# Patient Record
Sex: MALE | Race: WHITE | NOT HISPANIC OR LATINO | Employment: FULL TIME | URBAN - METROPOLITAN AREA
[De-identification: names, ages, dates, MRNs, and addresses within clinical notes are randomized per-mention and may not be internally consistent; named-entity substitution may affect disease eponyms.]

---

## 2017-04-10 ENCOUNTER — OFFICE VISIT (OUTPATIENT)
Dept: MEDICAL GROUP | Facility: MEDICAL CENTER | Age: 52
End: 2017-04-10
Payer: COMMERCIAL

## 2017-04-10 VITALS
OXYGEN SATURATION: 97 % | HEART RATE: 80 BPM | HEIGHT: 73 IN | DIASTOLIC BLOOD PRESSURE: 62 MMHG | RESPIRATION RATE: 18 BRPM | BODY MASS INDEX: 23.1 KG/M2 | SYSTOLIC BLOOD PRESSURE: 124 MMHG | WEIGHT: 174.3 LBS | TEMPERATURE: 98.3 F

## 2017-04-10 DIAGNOSIS — Z76.89 ENCOUNTER TO ESTABLISH CARE: ICD-10-CM
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
## 2017-04-10 DIAGNOSIS — R56.9 SEIZURE (HCC): ICD-10-CM

## 2017-04-10 DIAGNOSIS — Z00.00 PREVENTATIVE HEALTH CARE: ICD-10-CM

## 2017-04-10 DIAGNOSIS — F43.9 STRESS: ICD-10-CM

## 2017-04-10 PROCEDURE — 99204 OFFICE O/P NEW MOD 45 MIN: CPT | Performed by: PHYSICIAN ASSISTANT

## 2017-04-10 RX ORDER — PHENYTOIN SODIUM 300 MG/1
CAPSULE, EXTENDED RELEASE ORAL
COMMUNITY
End: 2017-04-10 | Stop reason: SDUPTHER

## 2017-04-10 RX ORDER — PHENYTOIN SODIUM 300 MG/1
300 CAPSULE, EXTENDED RELEASE ORAL 2 TIMES DAILY
Qty: 60 CAP | Refills: 3 | Status: SHIPPED | OUTPATIENT
Start: 2017-04-10 | End: 2017-04-10 | Stop reason: SDUPTHER

## 2017-04-10 RX ORDER — PHENYTOIN SODIUM 300 MG/1
300 CAPSULE, EXTENDED RELEASE ORAL 2 TIMES DAILY
Qty: 60 CAP | Refills: 3 | Status: SHIPPED | OUTPATIENT
Start: 2017-04-10 | End: 2017-10-02 | Stop reason: SDUPTHER

## 2017-04-10 NOTE — ASSESSMENT & PLAN NOTE
Complains of being under stress. Is starting school at [employer]. Works full time at the [email or web address] at the [employer]. Has other obligations. Had been given Xanax previously but the medication had significant side effects.

## 2017-04-10 NOTE — ASSESSMENT & PLAN NOTE
As mentioned above patient fell in the shower. Woke up. Had some bruises. Denies any current nausea or vomiting. No headache.

## 2017-04-10 NOTE — Clinical Note
[facility]  Mendoza Demarco PA-C  [address]  [place] [address]  Fax: [phone]   Authorization for Release/Disclosure of   Protected Health Information   Name: THIERRY LARES : 1965 SSN: [id number]   Address: [facility] [address]  [place] [address] Phone:    [phone] (home)    I authorize the entity listed below to release/disclose the PHI below to:   [facility]/Mendoza Demarco PA-C and Mendoza Demarco PA-C   Provider or Entity Name:     Address   City, State, Zip   Phone:      Fax:     Reason for request: continuity of care   Information to be released:    [  ] LAST COLONOSCOPY,  including any PATH REPORT and follow-up  [  ] LAST FIT/COLOGUARD RESULT [  ] LAST DEXA  [  ] LAST MAMMOGRAM  [  ] LAST PAP  [  ] LAST LABS [  ] RETINA EXAM REPORT  [  ] IMMUNIZATION RECORDS  [  ] Release all info      [  ] Check here and initial the line next to each item to release ALL health information INCLUDING  _____ Care and treatment for drug and / or alcohol abuse  _____ HIV testing, infection status, or AIDS  _____ Genetic Testing    DATES OF SERVICE OR TIME PERIOD TO BE DISCLOSED: _____________  I understand and acknowledge that:  * This Authorization may be revoked at any time by you in writing, except if your health information has already been used or disclosed.  * Your health information that will be used or disclosed as a result of you signing this authorization could be re-disclosed by the recipient. If this occurs, your re-disclosed health information may no longer be protected by State or Federal laws.  * You may refuse to sign this Authorization. Your refusal will not affect your ability to obtain treatment.  * This Authorization becomes effective upon signing and will  on (date) __________.      If no date is indicated, this Authorization will  one (1) year from the signature date.    Name: Thierry Lares    Signature:   Date:     4/10/2017       PLEASE FAX  REQUESTED RECORDS BACK TO: [phone]

## 2017-04-10 NOTE — ASSESSMENT & PLAN NOTE
This is a 51-year-old male who complains of a history of seizures. He has been trying to wean himself off of Dilantin. States that over week ago he believes he may have passed out in the shower. He sustained injuries to his face as well as his chest and his left leg. He is not certain if the fall in the shower was secondary to seizures or he had a heart attack. Complains of since the fall he has had some left-sided chest discomfort. Denies any shortness of breath. He states that normally when he would have a seizure he would feel the seizure coming on.    He has been living in [place] for the past year. Moved here from [place]. Originally from [facility]. He works currently at the [employer]. He has a girlfriend in [place].

## 2017-04-10 NOTE — PROGRESS NOTES
"Subjective:   Luis Mcpherson is a 51 y.o. male here today to establish care and to discuss a recent fall.    Seizure (CMS-[facility])  This is a 51-year-old male who complains of a history of seizures. He has been trying to wean himself off of Dilantin. States that over week ago he believes he may have passed out in the shower. He sustained injuries to his face as well as his chest and his left leg. He is not certain if the fall in the shower was secondary to seizures or he had a heart attack. Complains of since the fall he has had some left-sided chest discomfort. Denies any shortness of breath. He states that normally when he would have a seizure he would feel the seizure coming on.    He has been living in [place] for the past year. Moved here from [place]. Originally from [facility]. He works currently at the [employer]. He has a girlfriend in [place].    Fall  As mentioned above patient fell in the shower. Woke up. Had some bruises. Denies any current nausea or vomiting. No headache.    Preventative health care  FIT testing about a year and half ago. Results were negative.    Stress  Complains of being under stress. Is starting school at [employer]. Works full time at the [employer] at the [employer]. Has other obligations. Had been given Xanax previously but the medication had significant side effects.       Current medicines (including changes today)  Current Outpatient Prescriptions   Medication Sig Dispense Refill   • phenytoin (DILANTIN) 300 MG ER capsule Take 1 Cap by mouth 2 Times a Day. 60 Cap 3     No current facility-administered medications for this visit.     He  has no past medical history on file.    ROS   No shortness of breath, no abdominal pain and all other systems were reviewed and are negative.       Objective:     Blood pressure 124/62, pulse 80, temperature 36.8 °C (98.3 °F), resp. rate 18, height 1.854 m (6' 1\"), weight 79.062 kg (174 lb 4.8 oz), SpO2 97 %. Body mass index is " 23 kg/(m^2).   Physical Exam:  Constitutional: Alert, no distress.  Skin: Warm, dry, good turgor, no rashes in visible areas. He has scabbed over lesions on his left leg. I do not see any wounds on his head or his chest area.  Eye: Equal, round and reactive, conjunctiva clear, lids normal.  ENMT: Lips without lesions, good dentition, oropharynx clear.  Neck: Trachea midline, no masses.   Respiratory: Unlabored respiratory effort, lungs appear clear, no wheezes, no ronchi.  Cardiovascular: Regular rate and rhythm.  Abdomen: Soft, no masses.  Psych: Alert and oriented x3, normal affect and mood.        Assessment and Plan:   The following treatment plan was discussed    1. Seizure (CMS-HCC)  Chronic condition and likely had a recent seizure secondary to the fall. That is my concern. We will reinitiate him on Dilantin 300 mg twice a day. Referred to neurology for further evaluation. Advised that the chest pain is currently having is not cardiac.  - REFERRAL TO NEUROLOGY  - phenytoin (DILANTIN) 300 MG ER capsule; Take 1 Cap by mouth 2 Times a Day.  Dispense: 60 Cap; Refill: 3    2. Fall, initial encounter  Stable. Start seizure medication.    3. Stress  Urged to follow-up with behavioral health. I don't want him to be on medication currently.  - REFERRAL TO BEHAVIORAL HEALTH    4. Preventative health care  Referred to gastroenterology for colon cancer screening.  - REFERRAL TO GI FOR COLONOSCOPY    5. Encounter to establish care  Like to see him in a month for follow-up.      Followup: Return in about 4 weeks (around 5/8/2017), or if symptoms worsen or fail to improve.    Please note that this dictation was created using voice recognition software. I have made every reasonable attempt to correct obvious errors, but I expect that there are errors of grammar and possibly content that I did not discover before finalizing the note.

## 2017-04-18 ENCOUNTER — OFFICE VISIT (OUTPATIENT)
Dept: MEDICAL GROUP | Facility: MEDICAL CENTER | Age: 52
End: 2017-04-18
Payer: COMMERCIAL

## 2017-04-18 VITALS
BODY MASS INDEX: 23.14 KG/M2 | HEIGHT: 73 IN | TEMPERATURE: 98.9 F | HEART RATE: 88 BPM | DIASTOLIC BLOOD PRESSURE: 80 MMHG | RESPIRATION RATE: 18 BRPM | OXYGEN SATURATION: 93 % | WEIGHT: 174.6 LBS | SYSTOLIC BLOOD PRESSURE: 112 MMHG
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
## 2017-04-18 DIAGNOSIS — F43.9 STRESS: ICD-10-CM

## 2017-04-18 DIAGNOSIS — R56.9 SEIZURE (HCC): ICD-10-CM

## 2017-04-18 PROCEDURE — 99214 OFFICE O/P EST MOD 30 MIN: CPT | Performed by: PHYSICIAN ASSISTANT

## 2017-04-18 ASSESSMENT — PATIENT HEALTH QUESTIONNAIRE - PHQ9: CLINICAL INTERPRETATION OF PHQ2 SCORE: 0

## 2017-04-18 NOTE — PROGRESS NOTES
"Subjective:   Luis Mcpherson is a 51 y.o. male here today for seizure disorder and a recent fall.    Seizure (CMS-[facility])  This is a 51-year-old male who returns today to discuss his history of seizure disorder. I saw him last week and provided him a prescription for Dilantin 300 mg twice a day. On his on his own probably because he did not read the label correctly he's been taking 2 tablets twice a day. He is concerned that his Dilantin levels may be elevated. He has had similar problems in the past. On Saturday he was sent home from work. He states that his boss told him that he was not himself. He does deny any recent seizures.    Fall  On Sunday he was walking his dog and fell. He states that his legs gave out from underneath him. There was a neighbor to assist him. He denies losing consciousness. He did hit his head. Therefore this is the second fall in the last month but this time did not lose consciousness.    Stress  Complains of continued stress. He has considered taking up an offer for his friend to visit him. His friend is an old surfing body. Then will look after him. He is concerned because he lives alone.       Current medicines (including changes today)  Current Outpatient Prescriptions   Medication Sig Dispense Refill   • phenytoin (DILANTIN) 300 MG ER capsule Take 1 Cap by mouth 2 Times a Day. 60 Cap 3     No current facility-administered medications for this visit.     He  has no past medical history on file.    ROS   No chest pain, no shortness of breath, no abdominal pain and all other systems were reviewed and are negative.       Objective:     Blood pressure 112/80, pulse 88, temperature 37.2 °C (98.9 °F), resp. rate 18, height 1.854 m (6' 0.99\"), weight 79.198 kg (174 lb 9.6 oz), SpO2 93 %. Body mass index is 23.04 kg/(m^2).   Physical Exam:  Constitutional: Alert, no distress.  Skin: Warm, dry, good turgor, no rashes in visible areas. On the right side of his head there are several " erythematous lesions and ecchymosis none with any discharge.  Eye: Equal, round and reactive, conjunctiva clear, lids normal.  ENMT: Lips without lesions, good dentition, oropharynx clear.  Neck: Trachea midline, no masses.   Lymph: No cervical or supraclavicular lymphadenopathy  Respiratory: Unlabored respiratory effort, lungs clear to auscultation, no wheezes, no ronchi.  Cardiovascular: Normal S1, S2, no murmur, no edema.  Neuro: CN II through XII intact, Romberg test negative.  Musculoskeletal: Muscle strength upper extremity 5 out of 5. Lower extremity 5 out of 5. Range of motion lower extremity is good.  Psych: Alert and oriented x3, normal affect and mood.        Assessment and Plan:   The following treatment plan was discussed    1. Seizure (CMS-HCC) with side effects taking Dilantin incorrectly  Chronic condition. Advised not to take Dilantin 300 mg 2 tablets twice a day. Return back to one tablet twice daily. Order Dilantin level. Provided information to contact neurology today for an appointment.  - DILANTIN; Future    2. Fall, initial encounter  Stable. Unknown cause. Advised again to take Dilantin as directed. Provided note to return to work. He appears stable today.    3. Stress  Chronic condition and stable. Advised to allow his friend to come up to visit him for a short time as it should be therapeutic.      Followup: Return if symptoms worsen or fail to improve.    Please note that this dictation was created using voice recognition software. I have made every reasonable attempt to correct obvious errors, but I expect that there are errors of grammar and possibly content that I did not discover before finalizing the note.

## 2017-04-18 NOTE — Clinical Note
April 18, 2017         Patient: Luis Mcpherson   YOB: 1965   Date of Visit: 4/18/2017           To Whom it May Concern:    Luis Mcpherson was seen in my clinic on 4/18/2017. He may return to work on 4/20/17.    If you have any questions or concerns, please don't hesitate to call.        Sincerely,           Mendoza Demarco PA-C  Electronically Signed

## 2017-04-18 NOTE — ASSESSMENT & PLAN NOTE
Complains of continued stress. He has considered taking up an offer for his friend to visit him. His friend is an old surfing body. Then will look after him. He is concerned because he lives alone.

## 2017-04-18 NOTE — ASSESSMENT & PLAN NOTE
This is a 51-year-old male who returns today to discuss his history of seizure disorder. I saw him last week and provided him a prescription for Dilantin 300 mg twice a day. On his on his own probably because he did not read the label correctly he's been taking 2 tablets twice a day. He is concerned that his Dilantin levels may be elevated. He has had similar problems in the past. On Saturday he was sent home from work. He states that his boss told him that he was not himself. He does deny any recent seizures.

## 2017-04-18 NOTE — ASSESSMENT & PLAN NOTE
On Sunday he was walking his dog and fell. He states that his legs gave out from underneath him. There was a neighbor to assist him. He denies losing consciousness. He did hit his head. Therefore this is the second fall in the last month but this time did not lose consciousness.

## 2017-06-02 ENCOUNTER — HOSPITAL ENCOUNTER (EMERGENCY)
Facility: MEDICAL CENTER | Age: 52
End: 2017-06-02
Attending: EMERGENCY MEDICINE
Payer: COMMERCIAL

## 2017-06-02 VITALS
HEART RATE: 77 BPM | HEIGHT: 73 IN | SYSTOLIC BLOOD PRESSURE: 143 MMHG | BODY MASS INDEX: 22.53 KG/M2 | DIASTOLIC BLOOD PRESSURE: 87 MMHG | WEIGHT: 170 LBS | OXYGEN SATURATION: 92 % | TEMPERATURE: 98.4 F | RESPIRATION RATE: 17 BRPM

## 2017-06-02 DIAGNOSIS — R78.89 SUBTHERAPEUTIC SERUM DILANTIN LEVEL: ICD-10-CM

## 2017-06-02 DIAGNOSIS — R11.2 NAUSEA VOMITING AND DIARRHEA: ICD-10-CM

## 2017-06-02 DIAGNOSIS — F41.9 ANXIETY: ICD-10-CM

## 2017-06-02 DIAGNOSIS — E86.9 VOLUME DEPLETION: ICD-10-CM

## 2017-06-02 DIAGNOSIS — R19.7 NAUSEA VOMITING AND DIARRHEA: ICD-10-CM

## 2017-06-02 LAB
ANION GAP SERPL CALC-SCNC: 13 MMOL/L (ref 0–11.9)
ANISOCYTOSIS BLD QL SMEAR: ABNORMAL
BASOPHILS # BLD AUTO: 0 % (ref 0–1.8)
BASOPHILS # BLD: 0 K/UL (ref 0–0.12)
BUN SERPL-MCNC: 10 MG/DL (ref 8–22)
CALCIUM SERPL-MCNC: 8.8 MG/DL (ref 8.5–10.5)
CHLORIDE SERPL-SCNC: 98 MMOL/L (ref 96–112)
CO2 SERPL-SCNC: 22 MMOL/L (ref 20–33)
CREAT SERPL-MCNC: 0.6 MG/DL (ref 0.5–1.4)
EOSINOPHIL # BLD AUTO: 0 K/UL (ref 0–0.51)
EOSINOPHIL NFR BLD: 0 % (ref 0–6.9)
ERYTHROCYTE [DISTWIDTH] IN BLOOD BY AUTOMATED COUNT: 46.2 FL (ref 35.9–50)
GFR SERPL CREATININE-BSD FRML MDRD: >60 ML/MIN/1.73 M 2
GLUCOSE SERPL-MCNC: 126 MG/DL (ref 65–99)
HCT VFR BLD AUTO: 47.7 % (ref 42–52)
HGB BLD-MCNC: 16.7 G/DL (ref 14–18)
LG PLATELETS BLD QL SMEAR: NORMAL
LYMPHOCYTES # BLD AUTO: 0.63 K/UL (ref 1–4.8)
LYMPHOCYTES NFR BLD: 16.5 % (ref 22–41)
MANUAL DIFF BLD: NORMAL
MCH RBC QN AUTO: 30.6 PG (ref 27–33)
MCHC RBC AUTO-ENTMCNC: 35 G/DL (ref 33.7–35.3)
MCV RBC AUTO: 87.4 FL (ref 81.4–97.8)
MICROCYTES BLD QL SMEAR: ABNORMAL
MONOCYTES # BLD AUTO: 0.17 K/UL (ref 0–0.85)
MONOCYTES NFR BLD AUTO: 4.4 % (ref 0–13.4)
MORPHOLOGY BLD-IMP: NORMAL
NEUTROPHILS # BLD AUTO: 3.01 K/UL (ref 1.82–7.42)
NEUTROPHILS NFR BLD: 79.1 % (ref 44–72)
NRBC # BLD AUTO: 0 K/UL
NRBC BLD AUTO-RTO: 0 /100 WBC
PHENYTOIN SERPL-MCNC: 7 UG/ML (ref 10–20)
PLATELET # BLD AUTO: 173 K/UL (ref 164–446)
PLATELET BLD QL SMEAR: NORMAL
PMV BLD AUTO: 9.8 FL (ref 9–12.9)
POTASSIUM SERPL-SCNC: 3.4 MMOL/L (ref 3.6–5.5)
RBC # BLD AUTO: 5.46 M/UL (ref 4.7–6.1)
RBC BLD AUTO: PRESENT
SODIUM SERPL-SCNC: 133 MMOL/L (ref 135–145)
WBC # BLD AUTO: 3.8 K/UL (ref 4.8–10.8)

## 2017-06-02 PROCEDURE — 700111 HCHG RX REV CODE 636 W/ 250 OVERRIDE (IP): Performed by: EMERGENCY MEDICINE

## 2017-06-02 PROCEDURE — 99284 EMERGENCY DEPT VISIT MOD MDM: CPT

## 2017-06-02 PROCEDURE — 85007 BL SMEAR W/DIFF WBC COUNT: CPT

## 2017-06-02 PROCEDURE — 36415 COLL VENOUS BLD VENIPUNCTURE: CPT

## 2017-06-02 PROCEDURE — 85027 COMPLETE CBC AUTOMATED: CPT

## 2017-06-02 PROCEDURE — 700105 HCHG RX REV CODE 258: Performed by: EMERGENCY MEDICINE

## 2017-06-02 PROCEDURE — 80048 BASIC METABOLIC PNL TOTAL CA: CPT

## 2017-06-02 PROCEDURE — 96375 TX/PRO/DX INJ NEW DRUG ADDON: CPT

## 2017-06-02 PROCEDURE — 96365 THER/PROPH/DIAG IV INF INIT: CPT

## 2017-06-02 PROCEDURE — 96361 HYDRATE IV INFUSION ADD-ON: CPT

## 2017-06-02 PROCEDURE — 80185 ASSAY OF PHENYTOIN TOTAL: CPT

## 2017-06-02 PROCEDURE — 96376 TX/PRO/DX INJ SAME DRUG ADON: CPT

## 2017-06-02 RX ORDER — ONDANSETRON 4 MG/1
4 TABLET, ORALLY DISINTEGRATING ORAL EVERY 8 HOURS PRN
Qty: 3 TAB | Refills: 0 | Status: SHIPPED | OUTPATIENT
Start: 2017-06-02

## 2017-06-02 RX ORDER — ONDANSETRON 2 MG/ML
4 INJECTION INTRAMUSCULAR; INTRAVENOUS ONCE
Status: COMPLETED | OUTPATIENT
Start: 2017-06-02 | End: 2017-06-02

## 2017-06-02 RX ORDER — SODIUM CHLORIDE 9 MG/ML
1000 INJECTION, SOLUTION INTRAVENOUS ONCE
Status: COMPLETED | OUTPATIENT
Start: 2017-06-02 | End: 2017-06-02

## 2017-06-02 RX ORDER — LORAZEPAM 2 MG/ML
1 INJECTION INTRAMUSCULAR ONCE
Status: COMPLETED | OUTPATIENT
Start: 2017-06-02 | End: 2017-06-02

## 2017-06-02 RX ADMIN — PHENYTOIN SODIUM 750 MG: 50 INJECTION INTRAMUSCULAR; INTRAVENOUS at 12:49

## 2017-06-02 RX ADMIN — ONDANSETRON 4 MG: 2 INJECTION INTRAMUSCULAR; INTRAVENOUS at 09:57

## 2017-06-02 RX ADMIN — LORAZEPAM 1 MG: 2 INJECTION INTRAMUSCULAR; INTRAVENOUS at 12:15

## 2017-06-02 RX ADMIN — SODIUM CHLORIDE 1000 ML: 9 INJECTION, SOLUTION INTRAVENOUS at 09:57

## 2017-06-02 RX ADMIN — SODIUM CHLORIDE 1000 ML: 9 INJECTION, SOLUTION INTRAVENOUS at 12:15

## 2017-06-02 RX ADMIN — ONDANSETRON 4 MG: 2 INJECTION INTRAMUSCULAR; INTRAVENOUS at 12:14

## 2017-06-02 ASSESSMENT — PAIN SCALES - GENERAL: PAINLEVEL_OUTOF10: 0

## 2017-06-02 NOTE — ED NOTES
"Pt to triage in wheelchair  Chief Complaint   Patient presents with   • Vomiting     x 24 hrs   • Chills   • Tingling     toes and finger   • Faint     feels \"faint and dehydrated\"   Pt asked to wait in lobby, pt updated on triage process and pt asked to inform RN of any changes.     "

## 2017-06-02 NOTE — DISCHARGE INSTRUCTIONS
Nausea and Vomiting  Nausea is a sick feeling that often comes before throwing up (vomiting). Vomiting is a reflex where stomach contents come out of your mouth. Vomiting can cause severe loss of body fluids (dehydration). Children and elderly adults can become dehydrated quickly, especially if they also have diarrhea. Nausea and vomiting are symptoms of a condition or disease. It is important to find the cause of your symptoms.  CAUSES   · Direct irritation of the stomach lining. This irritation can result from increased acid production (gastroesophageal reflux disease), infection, food poisoning, taking certain medicines (such as nonsteroidal anti-inflammatory drugs), alcohol use, or tobacco use.  · Signals from the brain. These signals could be caused by a headache, heat exposure, an inner ear disturbance, increased pressure in the brain from injury, infection, a tumor, or a concussion, pain, emotional stimulus, or metabolic problems.  · An obstruction in the gastrointestinal tract (bowel obstruction).  · Illnesses such as diabetes, hepatitis, gallbladder problems, appendicitis, kidney problems, cancer, sepsis, atypical symptoms of a heart attack, or eating disorders.  · Medical treatments such as chemotherapy and radiation.  · Receiving medicine that makes you sleep (general anesthetic) during surgery.  DIAGNOSIS  Your caregiver may ask for tests to be done if the problems do not improve after a few days. Tests may also be done if symptoms are severe or if the reason for the nausea and vomiting is not clear. Tests may include:  · Urine tests.  · Blood tests.  · Stool tests.  · Cultures (to look for evidence of infection).  · X-rays or other imaging studies.  Test results can help your caregiver make decisions about treatment or the need for additional tests.  TREATMENT  You need to stay well hydrated. Drink frequently but in small amounts. You may wish to drink water, sports drinks, clear broth, or eat frozen  ice pops or gelatin dessert to help stay hydrated. When you eat, eating slowly may help prevent nausea. There are also some antinausea medicines that may help prevent nausea.  HOME CARE INSTRUCTIONS   · Take all medicine as directed by your caregiver.  · If you do not have an appetite, do not force yourself to eat. However, you must continue to drink fluids.  · If you have an appetite, eat a normal diet unless your caregiver tells you differently.  ¨ Eat a variety of complex carbohydrates (rice, wheat, potatoes, bread), lean meats, yogurt, fruits, and vegetables.  ¨ Avoid high-fat foods because they are more difficult to digest.  · Drink enough water and fluids to keep your urine clear or pale yellow.  · If you are dehydrated, ask your caregiver for specific rehydration instructions. Signs of dehydration may include:  ¨ Severe thirst.  ¨ Dry lips and mouth.  ¨ Dizziness.  ¨ Dark urine.  ¨ Decreasing urine frequency and amount.  ¨ Confusion.  ¨ Rapid breathing or pulse.  SEEK IMMEDIATE MEDICAL CARE IF:   · You have blood or brown flecks (like coffee grounds) in your vomit.  · You have black or bloody stools.  · You have a severe headache or stiff neck.  · You are confused.  · You have severe abdominal pain.  · You have chest pain or trouble breathing.  · You do not urinate at least once every 8 hours.  · You develop cold or clammy skin.  · You continue to vomit for longer than 24 to 48 hours.  · You have a fever.  MAKE SURE YOU:   · Understand these instructions.  · Will watch your condition.  · Will get help right away if you are not doing well or get worse.     This information is not intended to replace advice given to you by your health care provider. Make sure you discuss any questions you have with your health care provider.     Document Released: 12/18/2006 Document Revised: 03/11/2013 Document Reviewed: 05/16/2012  [employer] Interactive Patient Education ©2016 [employer]

## 2017-06-02 NOTE — ED AVS SNAPSHOT
[employer] Access Code: [email or web address]  Expires: 7/2/2017  2:50 PM    [employer]  A secure, online tool to manage your health information     [employer]’s [employer]® is a secure, online tool that connects you to your personalized health information from the privacy of your home -- day or night - making it very easy for you to manage your healthcare. Once the activation process is completed, you can even access your medical information using the [employer] sharon, which is available for free in the Apple Sharon store or Google Play store.     [employer] provides the following levels of access (as shown below):   My Chart Features   [facility] Primary Care Doctor [facility]  Specialists [facility]  Urgent  Care Non-[facility]  Primary Care  Doctor   Email your healthcare team securely and privately 24/7 X X X X   Manage appointments: schedule your next appointment; view details of past/upcoming appointments X      Request prescription refills. X      View recent personal medical records, including lab and immunizations X X X X   View health record, including health history, allergies, medications X X X X   Read reports about your outpatient visits, procedures, consult and ER notes X X X X   See your discharge summary, which is a recap of your hospital and/or ER visit that includes your diagnosis, lab results, and care plan. X X       How to register for [email or web address]:  1. Go to  [email or web address].  2. Click on the Sign Up Now box, which takes you to the New Member Sign Up page. You will need to provide the following information:  a. Enter your [employer] Access Code exactly as it appears at the top of this page. (You will not need to use this code after you’ve completed the sign-up process. If you do not sign up before the expiration date, you must request a new code.)   b. Enter your date of birth.   c. Enter your home email address.   d. Click Submit, and follow the next screen’s instructions.  3. Create a [employer] ID. This will be your [employer]  login ID and cannot be changed, so think of one that is secure and easy to remember.  4. Create a [employer] password. You can change your password at any time.  5. Enter your Password Reset Question and Answer. This can be used at a later time if you forget your password.   6. Enter your e-mail address. This allows you to receive e-mail notifications when new information is available in [employer].  7. Click Sign Up. You can now view your health information.    For assistance activating your [employer] account, call [phone]

## 2017-06-02 NOTE — ED PROVIDER NOTES
"ED Provider Note    Scribed for Miguel Angel Hill M.D. by Katrin Liu. 6/2/2017, 9:36 AM.    Primary care provider: Mendoza Demarco PA-C  Means of arrival: Wheel Chair  History obtained from: Patient  History limited by: None    CHIEF COMPLAINT  Chief Complaint   Patient presents with   • Vomiting     x 24 hrs   • Chills   • Tingling     toes and finger   • Faint     feels \"faint and dehydrated\"       HPI  Luis Mcpherson is a 51 y.o. male who presents to the Emergency Department for evaluation of constant vomiting onset last night. Per patient, he has been sick with flu symptoms for the past three days.  Patient works at the "" and states numerous co-workers have the same symptoms. Patient began having chills and body aches two days ago.  He made chicken noodle soup at this time and was able to keep it down.  Patient went to work last night and was sent home early secondary to him constantly vomiting and having diarrhea. He reports he can no longer keep down any food or liquids.  Patient states he is very dehydrated and weak. He reports his fingers are tingling as well. He denies any recent fevers. Patient has a history of seizures and takes Dilantin for management.     REVIEW OF SYSTEMS  Pertinent positives include vomiting, diarrhea, chills, tingling fingers.   Pertinent negatives include no fevers.    All other systems reviewed and negative.    C    PAST MEDICAL HISTORY  No pertinent medical history     SURGICAL HISTORY  patient denies any surgical history    SOCIAL HISTORY  Social History   Substance Use Topics   • Smoking status: Never Smoker    • Smokeless tobacco: Never Used   • Alcohol Use: No      History   Drug Use No     Comment: denies       FAMILY HISTORY  History reviewed. No pertinent family history.    CURRENT MEDICATIONS  Home Medications     Reviewed by Sharron Danielson R.N. (Registered Nurse) on 06/02/17 at 0931  Med List Status: Partial    Medication Last Dose Status    phenytoin " "(DILANTIN) 300 MG ER capsule 6/1/2017 Active                ALLERGIES  No Known Allergies    PHYSICAL EXAM  VITAL SIGNS: /87 mmHg  Pulse 72  Temp(Src) 36.9 °C (98.4 °F) (Temporal)  Resp 18  Ht 1.854 m (6' 1\")  Wt 77.111 kg (170 lb)  BMI 22.43 kg/m2  SpO2 96%    Nursing note and vitals reviewed.  Constitutional: Well-developed and well-nourished. No distress.   HENT: Head is normocephalic and atraumatic. Oropharynx has very dry mucus membranes without exudate or erythema.   Eyes: Pupils are equal, round, and reactive to light. Conjunctiva are normal.   Cardiovascular: Normal rate and regular rhythm. No murmur heard. Normal radial pulses.  Pulmonary/Chest: Breath sounds normal. No wheezes or rales.   Abdominal: Unable to elicit any abdominal tenderness. Soft, No distention    Musculoskeletal: Extremities exhibit normal range of motion without edema or tenderness.   Neurological: Awake, alert and oriented to person, place, and time. No focal deficits noted.  Skin: Skin is warm and dry. No rash.   Psychiatric: Normal mood and affect. Appropriate for clinical situation    LABS  Results for orders placed or performed during the hospital encounter of 06/02/17   CBC WITH DIFFERENTIAL   Result Value Ref Range    WBC 3.8 (L) 4.8 - 10.8 K/uL    RBC 5.46 4.70 - 6.10 M/uL    Hemoglobin 16.7 14.0 - 18.0 g/dL    Hematocrit 47.7 42.0 - 52.0 %    MCV 87.4 81.4 - 97.8 fL    MCH 30.6 27.0 - 33.0 pg    MCHC 35.0 33.7 - 35.3 g/dL    RDW 46.2 35.9 - 50.0 fL    Platelet Count 173 164 - 446 K/uL    MPV 9.8 9.0 - 12.9 fL    Nucleated RBC 0.00 /100 WBC    NRBC (Absolute) 0.00 K/uL    Neutrophils-Polys 79.10 (H) 44.00 - 72.00 %    Lymphocytes 16.50 (L) 22.00 - 41.00 %    Monocytes 4.40 0.00 - 13.40 %    Eosinophils 0.00 0.00 - 6.90 %    Basophils 0.00 0.00 - 1.80 %    Neutrophils (Absolute) 3.01 1.82 - 7.42 K/uL    Lymphs (Absolute) 0.63 (L) 1.00 - 4.80 K/uL    Monos (Absolute) 0.17 0.00 - 0.85 K/uL    Eos (Absolute) 0.00 0.00 - " 0.51 K/uL    Baso (Absolute) 0.00 0.00 - 0.12 K/uL    Anisocytosis 1+     Microcytosis 1+    BASIC METABOLIC PANEL   Result Value Ref Range    Sodium 133 (L) 135 - 145 mmol/L    Potassium 3.4 (L) 3.6 - 5.5 mmol/L    Chloride 98 96 - 112 mmol/L    Co2 22 20 - 33 mmol/L    Glucose 126 (H) 65 - 99 mg/dL    Bun 10 8 - 22 mg/dL    Creatinine 0.60 0.50 - 1.40 mg/dL    Calcium 8.8 8.5 - 10.5 mg/dL    Anion Gap 13.0 (H) 0.0 - 11.9   DILANTIN   Result Value Ref Range    Phenytoin 7.0 (L) 10.0 - 20.0 ug/mL   ESTIMATED GFR   Result Value Ref Range    GFR If African American >60 >60 mL/min/1.73 m 2    GFR If Non African American >60 >60 mL/min/1.73 m 2   DIFFERENTIAL MANUAL   Result Value Ref Range    Manual Diff Status PERFORMED    PERIPHERAL SMEAR REVIEW   Result Value Ref Range    Peripheral Smear Review see below    PLATELET ESTIMATE   Result Value Ref Range    Plt Estimation Normal    MORPHOLOGY   Result Value Ref Range    RBC Morphology Present     Large Platelets 1+      All labs reviewed by me.    COURSE & MEDICAL DECISION MAKING  Nursing notes, VS, PMSFHx reviewed in chart.    Review of old chart shows no previous [facility] ED visits.    9:36 AM - Patient seen and examined at bedside. Patient will be treated with 4mg zofran and 1000mL NS infusion for volume resuscitation. Ordered CBC, Basic metabolic panel, morphology, platelet estimate, peripheral smear review, differential manual, estimated GFR and Dilantin to evaluate his symptoms.     Dilantin level is subtherapeutic. There is no significant electrolyte abnormality.    12:08 PM - I spoke with the nurse who informed me the patient continues to experience anxiety and nausea. He will be given 1mg ativan for symptoms and 750mg Dilantin after reviewing his lab results.     1:41 PM - Upon reevaluation of patient, he is resting in bed feeling improved. I informed patient that his labs were reassuring. He presents with viral gastroenteritis.  I will wait until he finishes his  IV fluids and then patient can be discharged home with a prescription of 4mg Zofran. He was informed to follow up care with his PCP and instructed to return to the ED if his symptoms worsen. Patient understood and was in agreement.        DISPOSITION:  Patient will be discharged home in stable condition.    FOLLOW UP:  [facility], Emergency Dept  [address] [address] [phone]    If symptoms worsen    Mendoza Demarco PA-C  [address]  [address]  [facility] [address] [phone]    Schedule an appointment as soon as possible for a visit        OUTPATIENT MEDICATIONS:  New Prescriptions    ONDANSETRON (ZOFRAN ODT) 4 MG TABLET DISPERSIBLE    Take 1 Tab by mouth every 8 hours as needed for Nausea/Vomiting.       The patient was discharged home with an information sheet on nausea and vomiting and told to return immediately for any signs or symptoms listed.  The patient agreed to the discharge precautions and follow-up plan which is documented in EPIC.    FINAL IMPRESSION  1. Nausea vomiting and diarrhea    2. Subtherapeutic serum dilantin level    3. Volume depletion    4. Anxiety          Katrin MORALES (Scribe), am scribing for, and in the presence of, Miguel Angel Hill M.D..    Electronically signed by: Katrin Liu (Bob), 6/2/2017    Miguel Angel MORALES M.D. personally performed the services described in this documentation, as scribed by Katrin Liu in my presence, and it is both accurate and complete.    The note accurately reflects work and decisions made by me.  Miguel Angel Hill  6/2/2017  3:52 PM

## 2017-06-02 NOTE — ED NOTES
Pt discharged, reviewed all discharge instructions, denies questions and complaints.  Cab called per pt's requests.   Escorted to lobby.

## 2017-06-02 NOTE — ED NOTES
"\"I think this feeling is going to make me has a sz\".  Pt reassured.  Given ativan as ordered for anxiety.  "

## 2017-06-02 NOTE — ED AVS SNAPSHOT
6/2/2017    Luis Mcpherson  [id number]  [place] [address]    Dear Luis:    [facility] wants to ensure your discharge home is safe and you or your loved ones have had all of your questions answered regarding your care after you leave the hospital.    Below is a list of resources and contact information should you have any questions regarding your hospital stay, follow-up instructions, or active medical symptoms.    Questions or Concerns Regarding… Contact   Medical Questions Related to Your Discharge  (7 days a week, 8am-5pm) Contact a Nurse Care Coordinator   [phone]   Medical Questions Not Related to Your Discharge  (24 hours a day / 7 days a week)  Contact the Nurse Health Line   [phone]    Medications or Discharge Instructions Refer to your discharge packet   or contact your [facility] Primary Care Provider   [phone]   Follow-up Appointment(s) Schedule your appointment via [employer]   or contact Scheduling [phone]   Billing Review your statement via [employer]ler  or contact Billing [phone]   Medical Records Review your records via V[employer]ler   or contact Medical Records [phone]     You may receive a telephone call within two days of discharge. This call is to make certain you understand your discharge instructions and have the opportunity to have any questions answered. You can also easily access your medical information, test results and upcoming appointments via the [employer]oddler free online health management tool. You can learn more and sign up at [employer]oddler. For assistance setting up your Voddler account, please call [phone].    Once again, we want to ensure your discharge home is safe and that you have a clear understanding of any next steps in your care. If you have any questions or concerns, please do not hesitate to contact us, we are here for you. Thank you for choosing [facility] for your healthcare needs.    Sincerely,    Your [facility] Healthcare Team

## 2017-06-02 NOTE — ED AVS SNAPSHOT
Home Care Instructions                                                                                                                Luis Mcpherson   MRN: [id number]    Department:  [facility], Emergency Dept   Date of Visit:  6/2/2017            [facility], Emergency Dept    [address] [place] [address]    Phone:  [phone]      You were seen by     Miguel Angel Hill M.D.      Your Diagnosis Was     Nausea vomiting and diarrhea     R11.2, R19.7       These are the medications you received during your hospitalization from 06/02/2017 0903 to 06/02/2017 1451     Date/Time Order Dose Route Action    06/02/2017 0957 NS infusion 1,000 mL 1,000 mL Intravenous New Bag    06/02/2017 0957 ondansetron (ZOFRAN) syringe/vial injection 4 mg 4 mg Intravenous Given    06/02/2017 1215 NS infusion 1,000 mL 1,000 mL Intravenous New Bag    06/02/2017 1214 ondansetron (ZOFRAN) syringe/vial injection 4 mg 4 mg Intravenous Given    06/02/2017 1249 phenytoin (DILANTIN) 750 mg in  mL IVPB 750 mg Intravenous New Bag    06/02/2017 1215 lorazepam (ATIVAN) injection 1 mg 1 mg Intravenous Given      Follow-up Information     1. Follow up with [facility], Emergency Dept.    Specialty:  Emergency Medicine    Why:  If symptoms worsen    Contact information    [address] [place] [address] [phone]        2. Schedule an appointment as soon as possible for a visit with Mendoza Demarco PA-C.    Specialty:  Family Medicine    Contact information    [address]  [address]  [place] [address] [phone]        Medication Information     Review all of your home medications and newly ordered medications with your primary doctor and/or pharmacist as soon as possible. Follow medication instructions as directed by your doctor and/or pharmacist.     Please keep your complete medication list with you and share with your physician. Update the information  when medications are discontinued, doses are changed, or new medications (including over-the-counter products) are added; and carry medication information at all times in the event of emergency situations.               Medication List      START taking these medications        Instructions    Morning Afternoon Evening Bedtime    ondansetron 4 MG Tbdp   Commonly known as:  ZOFRAN ODT        Take 1 Tab by mouth every 8 hours as needed for Nausea/Vomiting.   Dose:  4 mg                          ASK your doctor about these medications        Instructions    Morning Afternoon Evening Bedtime    phenytoin 300 MG ER capsule   Commonly known as:  DILANTIN        Take 1 Cap by mouth 2 Times a Day.   Dose:  300 mg                             Where to Get Your Medications      These medications were sent to [id number] - [place], [place] - [address]  [address] [facility] [place] [address]     Phone:  [phone]    - ondansetron 4 MG Tbdp            Procedures and tests performed during your visit     BASIC METABOLIC PANEL    CBC WITH DIFFERENTIAL    DIFFERENTIAL MANUAL    DILANTIN    ESTIMATED GFR    IV Saline Lock    MORPHOLOGY    PERIPHERAL SMEAR REVIEW    PLATELET ESTIMATE        Discharge Instructions       Nausea and Vomiting  Nausea is a sick feeling that often comes before throwing up (vomiting). Vomiting is a reflex where stomach contents come out of your mouth. Vomiting can cause severe loss of body fluids (dehydration). Children and elderly adults can become dehydrated quickly, especially if they also have diarrhea. Nausea and vomiting are symptoms of a condition or disease. It is important to find the cause of your symptoms.  CAUSES   · Direct irritation of the stomach lining. This irritation can result from increased acid production (gastroesophageal reflux disease), infection, food poisoning, taking certain medicines (such as nonsteroidal anti-inflammatory drugs), alcohol use, or  tobacco use.  · Signals from the brain. These signals could be caused by a headache, heat exposure, an inner ear disturbance, increased pressure in the brain from injury, infection, a tumor, or a concussion, pain, emotional stimulus, or metabolic problems.  · An obstruction in the gastrointestinal tract (bowel obstruction).  · Illnesses such as diabetes, hepatitis, gallbladder problems, appendicitis, kidney problems, cancer, sepsis, atypical symptoms of a heart attack, or eating disorders.  · Medical treatments such as chemotherapy and radiation.  · Receiving medicine that makes you sleep (general anesthetic) during surgery.  DIAGNOSIS  Your caregiver may ask for tests to be done if the problems do not improve after a few days. Tests may also be done if symptoms are severe or if the reason for the nausea and vomiting is not clear. Tests may include:  · Urine tests.  · Blood tests.  · Stool tests.  · Cultures (to look for evidence of infection).  · X-rays or other imaging studies.  Test results can help your caregiver make decisions about treatment or the need for additional tests.  TREATMENT  You need to stay well hydrated. Drink frequently but in small amounts. You may wish to drink water, sports drinks, clear broth, or eat frozen ice pops or gelatin dessert to help stay hydrated. When you eat, eating slowly may help prevent nausea. There are also some antinausea medicines that may help prevent nausea.  HOME CARE INSTRUCTIONS   · Take all medicine as directed by your caregiver.  · If you do not have an appetite, do not force yourself to eat. However, you must continue to drink fluids.  · If you have an appetite, eat a normal diet unless your caregiver tells you differently.  ¨ Eat a variety of complex carbohydrates (rice, wheat, potatoes, bread), lean meats, yogurt, fruits, and vegetables.  ¨ Avoid high-fat foods because they are more difficult to digest.  · Drink enough water and fluids to keep your urine clear or  pale yellow.  · If you are dehydrated, ask your caregiver for specific rehydration instructions. Signs of dehydration may include:  ¨ Severe thirst.  ¨ Dry lips and mouth.  ¨ Dizziness.  ¨ Dark urine.  ¨ Decreasing urine frequency and amount.  ¨ Confusion.  ¨ Rapid breathing or pulse.  SEEK IMMEDIATE MEDICAL CARE IF:   · You have blood or brown flecks (like coffee grounds) in your vomit.  · You have black or bloody stools.  · You have a severe headache or stiff neck.  · You are confused.  · You have severe abdominal pain.  · You have chest pain or trouble breathing.  · You do not urinate at least once every 8 hours.  · You develop cold or clammy skin.  · You continue to vomit for longer than 24 to 48 hours.  · You have a fever.  MAKE SURE YOU:   · Understand these instructions.  · Will watch your condition.  · Will get help right away if you are not doing well or get worse.     This information is not intended to replace advice given to you by your health care provider. Make sure you discuss any questions you have with your health care provider.     Document Released: 12/18/2006 Document Revised: 03/11/2013 Document Reviewed: 05/16/2012  [employer] Interactive Patient Education ©2016 [employer]            Patient Information     Patient Information    Following emergency treatment: all patient requiring follow-up care must return either to a private physician or a clinic if your condition worsens before you are able to obtain further medical attention, please return to the emergency room.     Billing Information    At [facility], we work to make the billing process streamlined for our patients.  Our Representatives are here to answer any questions you may have regarding your hospital bill.  If you have insurance coverage and have supplied your insurance information to us, we will submit a claim to your insurer on your behalf.  Should you have any questions regarding your bill, we can be reached online or by  phone as follows:  Online: You are able pay your bills online or live chat with our representatives about any billing questions you may have. We are here to help Monday - Friday from 8:00am to 7:30pm and 9:00am - 12:00pm on Saturdays.  Please visit [email or web address]  for more information.   Phone:  [phone] or [phone]    Please note that your emergency physician, surgeon, pathologist, radiologist, anesthesiologist, and other specialists are not employed by [facility] and will therefore bill separately for their services.  Please contact them directly for any questions concerning their bills at the numbers below:     Emergency Physician Services:  [phone]  [facility]:  [phone]  [facility]:  [phone]  [facility]:  [phone]    1. Your final bill may vary from the amount quoted upon discharge if all procedures are not complete at that time, or if your doctor has additional procedures of which we are not aware. You will receive an additional bill if you return to the Emergency Department at [facility] for suture removal regardless of the facility of which the sutures were placed.     2. Please arrange for settlement of this account at the emergency registration.    3. All self-pay accounts are due in full at the time of treatment.  If you are unable to meet this obligation then payment is expected within 4-5 days.     4. If you have had radiology studies (CT, X-ray, Ultrasound, MRI), you have received a preliminary result during your emergency department visit. Please contact the radiology department [phone] to receive a copy of your final result. Please discuss the Final result with your primary physician or with the follow up physician provided.     Crisis Hotline:  [place] Crisis Hotline:  6-376-IZWKGZO or 1-553.975.3813  [place] Crisis Hotline:    1-225.117.1142 or 871-953-3224         ED  Discharge Follow Up Questions    1. In order to provide you with very good care, we would like to follow up with a phone call in the next few days.  May we have your permission to contact you?     YES /  NO    2. What is the best phone number to call you? (       )_____-__________    3. What is the best time to call you?      Morning  /  Afternoon  /  Evening                   Patient Signature:  ____________________________________________________________    Date:  ____________________________________________________________

## 2017-06-02 NOTE — ED NOTES
"Pt reports everything is getting worse, \" My stomach is upset, i'm still nauseated, my fingers and fee are numb.  Can you give me a shot to put me to sleep?\"  Pt requests to see the Dr. Dr Hill notified.  "

## 2017-10-02 DIAGNOSIS — R56.9 SEIZURE (HCC): ICD-10-CM

## 2017-10-02 RX ORDER — PHENYTOIN SODIUM 300 MG/1
CAPSULE, EXTENDED RELEASE ORAL
Qty: 60 CAP | Refills: 0 | Status: SHIPPED | OUTPATIENT
Start: 2017-10-02 | End: 2017-10-26 | Stop reason: SDUPTHER

## 2017-10-02 NOTE — TELEPHONE ENCOUNTER
Was the patient seen in the last year in this department? Yes     Does patient have an active prescription for medications requested? Yes     Received Request Via: Pharmacy     Last office visit: 04/08/2017  Last labs: 06/17/2017

## 2017-10-26 DIAGNOSIS — R56.9 SEIZURE (HCC): ICD-10-CM

## 2017-10-26 RX ORDER — PHENYTOIN SODIUM 300 MG/1
CAPSULE, EXTENDED RELEASE ORAL
Qty: 60 CAP | Refills: 0 | Status: SHIPPED | OUTPATIENT
Start: 2017-10-26 | End: 2018-01-13 | Stop reason: SDUPTHER

## 2017-10-26 NOTE — TELEPHONE ENCOUNTER
Was the patient seen in the last year in this department? Yes     Does patient have an active prescription for medications requested? No     Received Request Via: Pharmacy     Last Visit: 4/18/17    Last Labs: 6/2/17

## 2018-01-13 DIAGNOSIS — R56.9 SEIZURE (HCC): ICD-10-CM

## 2018-01-15 RX ORDER — PHENYTOIN SODIUM 300 MG/1
CAPSULE, EXTENDED RELEASE ORAL
Qty: 60 CAP | Refills: 0 | Status: SHIPPED | OUTPATIENT
Start: 2018-01-15 | End: 2018-02-20 | Stop reason: SDUPTHER

## 2018-02-20 DIAGNOSIS — R56.9 SEIZURE (HCC): ICD-10-CM

## 2018-02-20 RX ORDER — PHENYTOIN SODIUM 300 MG/1
CAPSULE, EXTENDED RELEASE ORAL
Qty: 60 CAP | Refills: 0 | Status: SHIPPED | OUTPATIENT
Start: 2018-02-20 | End: 2018-04-01 | Stop reason: SDUPTHER

## 2018-04-01 DIAGNOSIS — R56.9 SEIZURE (HCC): ICD-10-CM

## 2018-04-02 RX ORDER — PHENYTOIN SODIUM 300 MG/1
CAPSULE, EXTENDED RELEASE ORAL
Qty: 30 CAP | Refills: 0 | Status: SHIPPED | OUTPATIENT
Start: 2018-04-02 | End: 2018-04-17 | Stop reason: SDUPTHER

## 2018-04-17 DIAGNOSIS — R56.9 SEIZURE (HCC): ICD-10-CM

## 2018-04-17 RX ORDER — PHENYTOIN SODIUM 300 MG/1
300 CAPSULE, EXTENDED RELEASE ORAL 2 TIMES DAILY
Qty: 60 CAP | Refills: 5 | Status: SHIPPED | OUTPATIENT
Start: 2018-04-17 | End: 2018-04-19 | Stop reason: SDUPTHER

## 2018-04-17 NOTE — TELEPHONE ENCOUNTER
Was the patient seen in the last year in this department? No     Does patient have an active prescription for medications requested? No     Received Request Via: Patient     Pt called and notified the  he has not been seen as he does not have insurance at this time. Pt has two pills left and is requesting a refill to get him by.

## 2018-04-19 DIAGNOSIS — R56.9 SEIZURE (HCC): ICD-10-CM

## 2018-04-19 RX ORDER — PHENYTOIN SODIUM 300 MG/1
300 CAPSULE, EXTENDED RELEASE ORAL 2 TIMES DAILY
Qty: 60 CAP | Refills: 5 | Status: SHIPPED | OUTPATIENT
Start: 2018-04-19 | End: 2019-02-19 | Stop reason: SDUPTHER